# Patient Record
Sex: MALE | Race: WHITE | URBAN - METROPOLITAN AREA
[De-identification: names, ages, dates, MRNs, and addresses within clinical notes are randomized per-mention and may not be internally consistent; named-entity substitution may affect disease eponyms.]

---

## 2021-11-09 ENCOUNTER — EMERGENCY (EMERGENCY)
Facility: HOSPITAL | Age: 51
LOS: 1 days | Discharge: HOME | End: 2021-11-09
Attending: EMERGENCY MEDICINE | Admitting: EMERGENCY MEDICINE
Payer: MEDICAID

## 2021-11-09 VITALS
DIASTOLIC BLOOD PRESSURE: 81 MMHG | SYSTOLIC BLOOD PRESSURE: 130 MMHG | WEIGHT: 184.97 LBS | TEMPERATURE: 98 F | HEART RATE: 69 BPM | RESPIRATION RATE: 16 BRPM | OXYGEN SATURATION: 99 %
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
## 2021-11-09 DIAGNOSIS — S81.012A LACERATION WITHOUT FOREIGN BODY, LEFT KNEE, INITIAL ENCOUNTER: ICD-10-CM

## 2021-11-09 DIAGNOSIS — S70.311A ABRASION, RIGHT THIGH, INITIAL ENCOUNTER: ICD-10-CM

## 2021-11-09 DIAGNOSIS — S00.81XA ABRASION OF OTHER PART OF HEAD, INITIAL ENCOUNTER: ICD-10-CM
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
## 2021-11-09 DIAGNOSIS — S80.912A UNSPECIFIED SUPERFICIAL INJURY OF LEFT KNEE, INITIAL ENCOUNTER: ICD-10-CM

## 2021-11-09 PROCEDURE — 99283 EMERGENCY DEPT VISIT LOW MDM: CPT | Mod: 25

## 2021-11-09 PROCEDURE — 12032 INTMD RPR S/A/T/EXT 2.6-7.5: CPT

## 2021-11-09 RX ORDER — CEPHALEXIN 500 MG
1 CAPSULE ORAL
Qty: 28 | Refills: 0
Start: 2021-11-09 | End: 2021-11-15

## 2021-11-09 NOTE — ED PROVIDER NOTE - NS ED MD DISPO DISCHARGE CCDA
Problem: Pain Management  Goal: Pain level will decrease to patient's comfort goal  Outcome: PROGRESSING AS EXPECTED  Note: Pt with c/o right leg pain. Medicated him with tramadol 1 tab PRN with adequate relief. Pt sleeps ok. Will continue to monitor.     Problem: Urinary Elimination:  Goal: Ability to reestablish a normal urinary elimination pattern will improve  Outcome: PROGRESSING SLOWER THAN EXPECTED  Note: Pt unable to void at bedtime. His scan was >400ml. He didn't want to get up and try to urinate so straight cath'd him for 500ml of clear connor urine.      Patient/Caregiver provided printed discharge information.

## 2021-11-09 NOTE — ED ADULT TRIAGE NOTE - CHIEF COMPLAINT QUOTE
Patient with abrasion and a deep wound to left knee after running and tripping over pavement . Denies any LOC

## 2021-11-09 NOTE — ED ADULT NURSE NOTE - OBJECTIVE STATEMENT
49 y/o male who reports a mechanical fall while running- presents to ED with abrasion to right upper thigh, left lower leg and abrasion/avulsion to left knee- bleeding controlled. tdap is UTD

## 2021-11-09 NOTE — ED PROVIDER NOTE - CLINICAL SUMMARY MEDICAL DECISION MAKING FREE TEXT BOX
Deep laceration to L anterior lateral knee.  Patellar tendon intact.  Bony evaluation benign and no suspicion for fracture.  Wound numbed with lido with epi and explored and irrigated thoroughly.  Does not extend beyond dermis so will not perform a joint injection.  Wound cleansed, irrigated, and debrided given devitalized flap.  Wound repaired in flexed position.  Wound care and emergent return precautions discussed.  Pt and his wife demonstrate full understanding.  Rx for Keflex given for wound infx ppx given dirty nature of wound.  Last tetanus was within 5 years.

## 2021-11-09 NOTE — ED ADULT NURSE NOTE - NSIMPLEMENTINTERV_GEN_ALL_ED
Implemented All Fall Risk Interventions:  [place] to call system. Call bell, personal items and telephone within reach. Instruct patient to call for assistance. Room bathroom lighting operational. Non-slip footwear when patient is off stretcher. Physically safe environment: no spills, clutter or unnecessary equipment. Stretcher in lowest position, wheels locked, appropriate side rails in place. Provide visual cue, wrist band, yellow gown, etc. Monitor gait and stability. Monitor for mental status changes and reorient to person, place, and time. Review medications for side effects contributing to fall risk. Reinforce activity limits and safety measures with patient and family.

## 2021-11-09 NOTE — ED PROVIDER NOTE - PATIENT PORTAL LINK FT
You can access the FollowMyHealth Patient Portal offered by [facility] by registering at the following website: [email or web address]. By joining [facility] FollowMyHealth portal, you will also be able to view your health information using other applications (apps) compatible with our system.

## 2021-11-09 NOTE — ED ADULT NURSE NOTE - TEMPLATE LIST FOR HEAD TO TOE ASSESSMENT
Reason for Disposition   [1] MILD difficulty breathing  (e.g., minimal/no SOB at rest, SOB with walking) AND [2] worse than normal    Protocols used: COPD OXYGEN MONITORING AND [id number]    Pt c/o oxygen level dropping to 91% when moving around the house and 93% when sitting there. States it normally is 94%. Requesting appt, appt made. Does have chest and back soreness only when coughing.   
Patient has an appointment to see Johnathan today.   
Wounds

## 2021-11-09 NOTE — ED PROCEDURE NOTE - CPROC ED LACER REPAIR DETAIL1
The wound was explored to base in bloodless field./No foreign body/Multiple flaps were aligned./Undermining was performed./Extensive debridement was performed.

## 2021-11-09 NOTE — ED PROVIDER NOTE - OBJECTIVE STATEMENT
Pt is a 51yo M with no PMH who p/w wound to L knee.  Pt is here visiting from [place], [place] and was out running with his wife.  he sustained a mechanical trip and fall and scrapped his L knee to pavement.  Last tetanus was 5 years ago.  Denies any pain to LE joints and has been able to walk extensively since fall.  Notes deep skin injury to L knee.  No active bleeding.  Went to a local [facility] and was sent here for further evaluation and wound repair.  Denies any issue with bending or extending knee joint.  Denies any head injury or other complaints.

## 2021-11-09 NOTE — ED PROVIDER NOTE - CARE PLAN
1 <<-----Click here for Discharge Medication Review Principal Discharge DX:	Laceration of left knee, initial encounter

## 2021-11-09 NOTE — ED PROVIDER NOTE - NSFOLLOWUPINSTRUCTIONS_ED_ALL_ED_FT
KEEP WOUND DRY FOR 24 HOURS.      DRESS WOUND IN BACITRACIN TWICE A DAY FOR 7 DAYS.     KEEP WOUND COVERED FOR 4-5 DAYS AND BE SURE TO CHANGE DRESSING AT LEASE TWICE A DAY.      THE STITCHES WILL NEED TO STAY IN FOR 2 WEEKS.  YOU HAD THE FOLLOWING:  -2 DISSOLVABLE DEEP STITCHES  -2 HORIZONTAL MATTRESS STITCHES  -2 SIMPLE INTERRUPTED STITCHES    PLEASE RETURN TO THE ER IMMEDIATELY FOR ANY REDNESS, SWELLING, SEVERE PAIN, PUS DRAINAGE, FEVER, KNEE SWELLING, PAIN WITH KNEE BENDING, OR ANY OTHER CONCERNS FOR INFECTION/WORSENING.

## 2021-11-09 NOTE — ED PROVIDER NOTE - PHYSICAL EXAMINATION
CONSTITUTIONAL: Well appearing, well nourished, awake, alert, oriented to person, place, time/situation and in no apparent distress.  ENT: Airway patent, Nasal mucosa clear. Mouth with normal mucosa.  EYES: Clear bilaterally.  RESPIRATORY: Breathing comfortably with normal RR.  MSK: Range of motion is not limited.  L knee - patellar function fully intact.  No TTP over L knee joint.  ROM.  NVID.  Normal gait.   NEURO: Alert and oriented, no focal deficits.  SKIN: ~4cm stellite lac to L anterior lateral knee. Depth to dermis but no exposure of joint capsule or tendon sheath.  Central flap of wound is devitalized.  No FBs.  Mild abrasion to R distal medial thigh.  +Large superficial abrasion to L anterior shin.    PSYCH: Alert and oriented to person, place, time/situation. normal mood and affect. no apparent risk to self or others.

## 2024-06-04 NOTE — ED ADULT NURSE NOTE - NSINTERVENTIONOPT_GEN_ALL_ED
[facility] Cardiology Progress Note  Dr. Esa Crystal      Referring Physician: Samuel Quintero DO  CHIEF COMPLAINT:   Chief Complaint   Patient presents with    Coronary Artery Disease     9 month       HISTORY OF PRESENT ILLNESS:   Patient is 60 years old male with history of CAD s/p PCI to diagonal branch, OM branch and RCA, lateral wall MI, is here for follow-up appointment.    Patient denies any chest pain, no shortness of breath, no lightheadedness, no dizziness, no palpitations, no pedal edema, no PND, no orthopnea, no syncope, no presyncopal episodes.  Functional capacity is good for age    Past Medical History:   Diagnosis Date    CAD (coronary artery disease)     Elevated liver enzymes     Hyperlipidemia     Impaired fasting glucose     Obesity (BMI 30.0-34.9)     34    Umbilical hernia          Past Surgical History:   Procedure Laterality Date    CORONARY ANGIOPLASTY WITH STENT PLACEMENT  09/27/2022    Dr. Marah Lala; 2.25x22 D1; 2.25x22 Cx, OM1    CORONARY ANGIOPLASTY WITH STENT PLACEMENT  09/28/2022    Dr Merry Lala DANIEL 4.0x15 Mid RCA    EYE SURGERY      HERNIA REPAIR           Current Outpatient Medications   Medication Sig Dispense Refill    atorvastatin (LIPITOR) 80 MG tablet Take 1 tablet by mouth nightly 90 tablet 3    clopidogrel (PLAVIX) 75 MG tablet Take 1 tablet by mouth daily 90 tablet 3    aspirin 81 MG EC tablet Take 1 tablet by mouth daily 30 tablet 3     No current facility-administered medications for this visit.         Allergies as of 06/04/2024    (No Known Allergies)       Social History     Socioeconomic History    Marital status:      Spouse name: Not on file    Number of children: Not on file    Years of education: Not on file    Highest education level: Not on file   Occupational History    Not on file   Tobacco Use    Smoking status: Former     Current packs/day: 0.00     Average packs/day: 1.5 packs/day for 21.1 years (31.7 ttl pk-yrs)     Types: 
Enhanced Supervision